# Patient Record
Sex: MALE | Race: WHITE | NOT HISPANIC OR LATINO | URBAN - METROPOLITAN AREA
[De-identification: names, ages, dates, MRNs, and addresses within clinical notes are randomized per-mention and may not be internally consistent; named-entity substitution may affect disease eponyms.]

---

## 2024-06-19 ENCOUNTER — EMERGENCY (EMERGENCY)
Facility: HOSPITAL | Age: 34
LOS: 1 days | Discharge: HOME | End: 2024-06-19
Attending: EMERGENCY MEDICINE

## 2024-06-19 VITALS
OXYGEN SATURATION: 98 % | DIASTOLIC BLOOD PRESSURE: 83 MMHG | HEART RATE: 124 BPM | SYSTOLIC BLOOD PRESSURE: 131 MMHG | TEMPERATURE: 99 F | RESPIRATION RATE: 18 BRPM

## 2024-06-19 PROCEDURE — 10061 I&D ABSCESS COMP/MULTIPLE: CPT

## 2024-06-19 PROCEDURE — 10060 I&D ABSCESS SIMPLE/SINGLE: CPT

## 2024-06-19 PROCEDURE — 99283 EMERGENCY DEPT VISIT LOW MDM: CPT

## 2024-06-19 PROCEDURE — 99283 EMERGENCY DEPT VISIT LOW MDM: CPT | Mod: 25

## 2024-06-19 RX ORDER — CEPHALEXIN 500 MG
1 CAPSULE ORAL
Qty: 40 | Refills: 0
Start: 2024-06-19 | End: 2024-06-28

## 2024-06-19 RX ORDER — CEPHALEXIN 500 MG
500 CAPSULE ORAL ONCE
Refills: 0 | Status: COMPLETED | OUTPATIENT
Start: 2024-06-19 | End: 2024-06-19

## 2024-06-19 RX ADMIN — Medication 100 MILLIGRAM(S): at 21:59

## 2024-06-19 RX ADMIN — Medication 500 MILLIGRAM(S): at 21:59

## 2024-06-19 NOTE — ED PROVIDER NOTE - ADDITIONAL NOTES AND INSTRUCTIONS:
PT was evaluated At [facility] ED and was found to have a condition that warranted time of to rest and heal from WORK/SCHOOL.   Geovanni Murphy PA-C

## 2024-06-19 NOTE — ED ADULT TRIAGE NOTE - CHIEF COMPLAINT QUOTE
Pt walks in for triage after having abscess on left calf that started 4 days ago. Leg is swollen and red.

## 2024-06-19 NOTE — ED PROVIDER NOTE - OBJECTIVE STATEMENT
PT with SPMHX of IVDA   presents to the ED with complaint of Lt calf swelling and collection. Pt states that over the last 3 days he has had a gradgual onset of swelling, redness, and tenderness over area that he had shot heroin into a few days prior.

## 2024-06-19 NOTE — ED PROVIDER NOTE - NSFOLLOWUPINSTRUCTIONS_ED_ALL_ED_FT
Patient education: Skin abscess drainage (The Basics)  Written by the doctors and editors at [facility]  Please read the Disclaimer at the end of this page.    What is a skin abscess?  A skin abscess is a painful bump that forms when pus collects under the skin (picture 1). It looks like a pimple, but it is usually much larger. Skin abscesses often form when there is a cut or nick in the skin and bacteria from the skin's surface gets in. An abscess can be caused by an ingrown hair, or from close contact with someone who carries a certain type of bacteria.    The infection causes the skin to become swollen and painful. It also makes the skin look red or darker in color.    Less often, a skin abscess might be caused by another type of germ, like a virus, fungus, or parasite.    To treat a skin abscess, a doctor or nurse makes a small cut on the surface of the abscess to drain the pus. This is called "incision and drainage." In some cases, they will use a needle to drain the pus instead.    How do I know if my abscess needs to be drained?  Your doctor or nurse will decide after looking at your abscess and learning about your symptoms. Most abscesses need to be cut and drained. But if the abscess is small (less than 3/4 inch, or 2 cm, in size) or draining pus on its own, it might not need to be drained.    How do I prepare for abscess drainage?  The doctor or nurse will tell you if you need to do anything special to prepare.    Before your procedure, your doctor will do an exam. They might send you to get tests, such as:    ?Lab tests    ?Imaging tests – These create pictures of the inside of the body.    Your doctor will also ask you about your "health history." This involves asking you questions about any health problems you have or had in the past, past surgeries, and any medicines you take. Tell them about:    ?Any medicines you are taking – This includes any prescription or "over-the-counter" medicines you use, plus any herbal supplements you take. It helps to write down and bring a list of any medicines you take, or bring a bag with all of your medicines with you.    ?Any allergies you have    ?Any bleeding problems you have – Certain medicines, including some herbs and supplements, can increase the risk of bleeding. Some health conditions also increase this risk.    You will also get information about:    ?Eating and drinking before your procedure – In some cases, you might need to "fast" before your procedure. This means not eating or drinking anything for a period of time. In other cases, you might be allowed to have liquids until a short time before the procedure. Whether you need to fast, and for how long, depends on the procedure you are?having.    ?What help you will need when you go home – For example, you might need to have someone else bring you home or stay with you for some time while you recover.    Ask the doctor or nurse if you have questions or if there is anything you do not understand.    What happens during abscess drainage?  When it is time for the procedure:    ?You will get a shot of numbing medicine in the area around the abscess. This is so you do not feel pain during the procedure.    ?The doctor might use an ultrasound to see the collection of fluid under your skin.    ?To let the pus and fluid drain from the abscess, the doctor will usually make a small cut on the surface of the abscess. They might use a tool to "probe" or feel inside the abscess. This is to make sure that there are no hidden pockets of pus.    ?The doctor might rinse out the abscess.    ?The abscess might be packed with gauze, or a small drain might be put in – These help additional pus drain from the abscess. But they are not always needed.    ?The doctor will cover the area with a piece of clean, dry gauze.    Some people get "sedation" for draining an abscess. This involves getting medicine to help them relax. If you are having sedation:    ?You might get an "IV," which is a thin tube that goes into a vein. This can be used to give you fluids and medicines.    ?You will get sedatives – These are medicines to make you relax and feel sleepy.    ?The doctors and nurses will monitor your breathing, blood pressure, and heart rate during the procedure.    What happens after abscess drainage?  The staff will talk with you about how to care for the drained abscess.    If you had sedation, you might be taken to a recovery room. The staff will watch you closely as your sedatives wear off. As you recover from sedatives, you might feel groggy or confused for a short time. You might also feel nauseous or vomit. The doctor or nurse can give you medicine to help with this.    What are the risks of skin abscess drainage?  Your doctor will talk to you about all of the possible risks and answer your questions. Possible risks include:    ?Not draining the abscess completely – If this happens, the abscess might not heal completely or might even grow larger.    ?Damage to any nearby structures, such as nerves or blood vessels    ?Pain – Pain can come from the shot of numbing medicine, the incision or needle going in, the probing, or the packing.    ?Scarring – The incision will heal with a scar. Your doctor will try to make the scar as small and unnoticeable as possible.

## 2024-06-19 NOTE — ED PROVIDER NOTE - ATTENDING APP SHARED VISIT CONTRIBUTION OF CARE
The patient presents with calf redness and swelling and tenderness in the area of IVDA  Tender and fluctulant  Normal pulse  No bony ttp    Impression: Calf abscess with cellulitis    I, Jam Smyth, performed the initial face to face bedside interview with this patient regarding history of present illness, review of symptoms and relevant past medical, social and family history.  I completed an independent physical examination.  I was the initial provider who evaluated this patient. I have signed out the follow up of any pending tests (i.e. labs, radiological studies) to the ACP.  I have communicated the patient’s plan of care and disposition with the ACP.

## 2024-06-19 NOTE — ED PROVIDER NOTE - CLINICAL SUMMARY MEDICAL DECISION MAKING FREE TEXT BOX
PT with SPMHX of IVDA   presents to the ED with complaint of Lt calf swelling and collection. Pt states that over the last 3 days he has had a gradgual onset of swelling, redness, and tenderness over area that he had shot heroin into a few days prior.  I and D and will prescribe antibiotics

## 2024-06-19 NOTE — ED ADULT NURSE NOTE - OBJECTIVE STATEMENT
patient presents with left calf abscess and pain for 4 days, pt with use of drug abuse. patient denies fever, numbness or tingling.

## 2024-06-19 NOTE — ED PROVIDER NOTE - PATIENT PORTAL LINK FT
You can access the FollowMyHealth Patient Portal offered by [facility] by registering at the following website: [email or web address]. By joining [facility]’s FollowMyHealth portal, you will also be able to view your health information using other applications (apps) compatible with our system.

## 2024-12-02 PROBLEM — Z00.00 ENCOUNTER FOR PREVENTIVE HEALTH EXAMINATION: Status: ACTIVE | Noted: 2024-12-02

## 2025-04-03 ENCOUNTER — EMERGENCY (EMERGENCY)
Facility: HOSPITAL | Age: 35
LOS: 1 days | End: 2025-04-03
Attending: EMERGENCY MEDICINE
Payer: MEDICAID

## 2025-04-03 VITALS
DIASTOLIC BLOOD PRESSURE: 84 MMHG | OXYGEN SATURATION: 99 % | HEIGHT: 74 IN | SYSTOLIC BLOOD PRESSURE: 123 MMHG | WEIGHT: 146.61 LBS | RESPIRATION RATE: 18 BRPM | HEART RATE: 106 BPM | TEMPERATURE: 98 F

## 2025-04-03 PROCEDURE — L9991: CPT

## 2025-04-03 NOTE — ED ADULT TRIAGE NOTE - CHIEF COMPLAINT QUOTE
Patient presents to ED with c/o right sided facial swelling, pain and redness times 2 days.  Per patient, it started as a small lump on the inner upper gum area and progressively got worse.  Last Tylenol yesterday.

## 2025-04-04 ENCOUNTER — TRANSCRIPTION ENCOUNTER (OUTPATIENT)
Age: 35
End: 2025-04-04